# Patient Record
Sex: MALE | Race: WHITE | URBAN - METROPOLITAN AREA
[De-identification: names, ages, dates, MRNs, and addresses within clinical notes are randomized per-mention and may not be internally consistent; named-entity substitution may affect disease eponyms.]

---

## 2017-04-24 ENCOUNTER — OFFICE VISIT (OUTPATIENT)
Dept: ORTHOPEDIC SURGERY | Facility: CLINIC | Age: 47
End: 2017-04-24

## 2017-04-24 VITALS
BODY MASS INDEX: 31.14 KG/M2 | HEIGHT: 73 IN | HEART RATE: 64 BPM | DIASTOLIC BLOOD PRESSURE: 105 MMHG | WEIGHT: 235 LBS | TEMPERATURE: 97.4 F | SYSTOLIC BLOOD PRESSURE: 184 MMHG

## 2017-04-24 DIAGNOSIS — E66.9 OBESITY (BMI 30.0-34.9): ICD-10-CM

## 2017-04-24 DIAGNOSIS — T63.301A SPIDER BITE, ACCIDENTAL OR UNINTENTIONAL, INITIAL ENCOUNTER: ICD-10-CM

## 2017-04-24 DIAGNOSIS — M16.11 PRIMARY OSTEOARTHRITIS OF RIGHT HIP: Primary | ICD-10-CM

## 2017-04-24 DIAGNOSIS — M25.551 RIGHT HIP PAIN: ICD-10-CM

## 2017-04-24 RX ORDER — BUPIVACAINE HYDROCHLORIDE 2.5 MG/ML
4 INJECTION, SOLUTION EPIDURAL; INFILTRATION; INTRACAUDAL ONCE
Qty: 4 ML | Refills: 0
Start: 2017-04-24 | End: 2017-04-24

## 2017-04-24 RX ORDER — LEVOTHYROXINE SODIUM 100 UG/1
TABLET ORAL
COMMUNITY

## 2017-04-24 RX ORDER — BETAMETHASONE SODIUM PHOSPHATE AND BETAMETHASONE ACETATE 3; 3 MG/ML; MG/ML
3 INJECTION, SUSPENSION INTRA-ARTICULAR; INTRALESIONAL; INTRAMUSCULAR; SOFT TISSUE ONCE
Qty: 0.5 ML | Refills: 0
Start: 2017-04-24 | End: 2017-04-24

## 2017-04-24 NOTE — PATIENT INSTRUCTIONS
Hip Arthritis: Exercises  Your Care Instructions  Here are some examples of exercises for hip arthritis. Start each exercise slowly. Ease off the exercise if you start to have pain. Your doctor or physical therapist will tell you when you can start these exercises and which ones will work best for you. How to do the exercises  Straight-leg raises to the outside    1. Lie on your side, with your affected hip on top. 2. Tighten the front thigh muscles of your top leg to keep your knee straight. 3. Keep your hip and your leg straight in line with the rest of your body, and keep your knee pointing forward. Do not drop your hip back. 4. Lift your top leg straight up toward the ceiling, about 12 inches off the floor. Hold for about 6 seconds, then slowly lower your leg. 5. Repeat 8 to 12 times. 6. Switch legs and repeat steps 1 through 5, even if only one hip is sore. Straight-leg raises to the inside    1. Lie on your side with your affected hip on the floor. 2. You can either prop up your other leg on a chair, or you can bend that knee and put that foot in front of your other knee. Do not drop your hip back. 3. Tighten the muscles on the front thigh of your bottom leg to straighten that knee. 4. Keep your kneecap pointing forward and your leg straight, and lift your bottom leg up toward the ceiling about 6 inches. Hold for about 6 seconds, then lower slowly. 5. Repeat 8 to 12 times. 6. Switch legs and repeat steps 1 through 5, even if only one hip is sore. Hip hike    1. Stand sideways on the bottom step of a staircase, and hold on to the banister or wall. 2. Keeping both knees straight, lift your good leg off the step and let it hang down. Then hike your good hip up to the same level as your affected hip or a little higher. 3. Repeat 8 to 12 times. 4. Switch legs and repeat steps 1 through 3, even if only one hip is sore. Bridging    1. Lie on your back with both knees bent.  Your knees should be bent about 90 degrees. 2. Then push your feet into the floor, squeeze your buttocks, and lift your hips off the floor until your shoulders, hips, and knees are all in a straight line. 3. Hold for about 6 seconds as you continue to breathe normally, and then slowly lower your hips back down to the floor and rest for up to 10 seconds. 4. Repeat 8 to 12 times. Hamstring stretch (lying down)    1. Lie flat on your back with your legs straight. If you feel discomfort in your back, place a small towel roll under your lower back. 2. Holding the back of your affected leg, lift your leg straight up and toward your body until you feel a stretch at the back of your thigh. 3. Hold the stretch for at least 30 seconds. 4. Repeat 2 to 4 times. 5. Switch legs and repeat steps 1 through 4, even if only one hip is sore. Standing quadriceps stretch    1. If you are not steady on your feet, hold on to a chair, counter, or wall. You can also lie on your stomach or your side to do this exercise. 2. Bend the knee of the leg you want to stretch, and reach behind you to grab the front of your foot or ankle with the hand on the same side. For example, if you are stretching your right leg, use your right hand. 3. Keeping your knees next to each other, pull your foot toward your buttock until you feel a gentle stretch across the front of your hip and down the front of your thigh. Your knee should be pointed directly to the ground, and not out to the side. 4. Hold the stretch for at least 15 to 30 seconds. 5. Repeat 2 to 4 times. 6. Switch legs and repeat steps 1 through 5, even if only one hip is sore. Hip rotator stretch    1. Lie on your back with both knees bent and your feet flat on the floor. 2. Put the ankle of your affected leg on your opposite thigh near your knee. 3. Use your hand to gently push your knee away from your body until you feel a gentle stretch around your hip.   4. Hold the stretch for 15 to 30 seconds. 5. Repeat 2 to 4 times. 6. Repeat steps 1 through 5, but this time use your hand to gently pull your knee toward your opposite shoulder. 7. Switch legs and repeat steps 1 through 6, even if only one hip is sore. Knee-to-chest    1. Lie on your back with your knees bent and your feet flat on the floor. 2. Bring your affected leg to your chest, keeping the other foot flat on the floor (or keeping the other leg straight, whichever feels better on your lower back). 3. Keep your lower back pressed to the floor. Hold for at least 15 to 30 seconds. 4. Relax, and lower the knee to the starting position. 5. Repeat 2 to 4 times. 6. Switch legs and repeat steps 1 through 5, even if only one hip is sore. 7. To get more stretch, put your other leg flat on the floor while pulling your knee to your chest.  Clamshell    1. Lie on your side, with your affected hip on top. Keep your feet and knees together and your knees bent. 2. Raise your top knee, but keep your feet together. Do not let your hips roll back. Your legs should open up like a clamshell. 3. Hold for 6 seconds. 4. Slowly lower your knee back down. Rest for 10 seconds. 5. Repeat 8 to 12 times. 6. Switch legs and repeat steps 1 through 5, even if only one hip is sore. Follow-up care is a key part of your treatment and safety. Be sure to make and go to all appointments, and call your doctor if you are having problems. It's also a good idea to know your test results and keep a list of the medicines you take. Where can you learn more? Go to http://rajiv-cari.info/. Enter B414 in the search box to learn more about \"Hip Arthritis: Exercises. \"  Current as of: May 23, 2016  Content Version: 11.2  © 1220-8253 [employer]. Care instructions adapted under license by [employer] (which disclaims liability or warranty for this information).  If you have questions about a medical condition or this instruction, always ask your healthcare professional. [address] any warranty or liability for your use of this information. Joint Injections: Care Instructions  Your Care Instructions  Joint injections are shots into a joint, such as the knee. They may be used to put in medicines, such as pain relievers. Or they can be used to take out fluid. Sometimes the fluid is tested in a lab. This can help find the cause of a joint problem. A corticosteroid, or steroid, shot is used to reduce inflammation in tendons or joints. It is often used to treat problems such as arthritis, tendinitis, and bursitis. Steroids can be injected directly into a painful, inflamed joint. They can also help reduce inflammation of a bursa. A bursa is a sac of fluid. It cushions and lubricates areas where tendons, ligaments, skin, muscles, or bones rub against each other. A steroid shot can sometimes help with short-term pain relief when other treatments haven't worked. If steroid shots help, pain may improve for weeks or months. Follow-up care is a key part of your treatment and safety. Be sure to make and go to all appointments, and call your doctor if you are having problems. It's also a good idea to know your test results and keep a list of the medicines you take. How can you care for yourself at home? · Put ice or a cold pack on the area for 10 to 20 minutes at a time. Put a thin cloth between the ice and your skin. · Take anti-inflammatory medicines to reduce pain, swelling, or inflammation. These include ibuprofen (Advil, Motrin) and naproxen (Aleve). Read and follow all instructions on the label. · Avoid strenuous activities for several days, especially those that put stress on the area where you got the shot. · If you have dressings over the area, keep them clean and dry. You may remove them when your doctor tells you to. When should you call for help?   Call your doctor now or seek immediate medical care if:  · You have signs of infection, such as:  ¨ Increased pain, swelling, warmth, or redness. ¨ Red streaks leading from the site. ¨ Pus draining from the site. ¨ A fever. Watch closely for changes in your health, and be sure to contact your doctor if you have any problems. Where can you learn more? Go to http://rajiv-cari.info/. Enter N616 in the search box to learn more about \"Joint Injections: Care Instructions. \"  Current as of: May 23, 2016  Content Version: 11.2  © 4662-8586 [employer]. Care instructions adapted under license by [employer] (which disclaims liability or warranty for this information). If you have questions about a medical condition or this instruction, always ask your healthcare professional. [place] [address] any warranty or liability for your use of this information. Deciding About Total Hip Replacement  Your Care Instructions    Total hip replacement is surgery to replace the worn parts of your hip joint. You may be thinking about having this surgery if you have severe hip pain that limits your movement. Many people get arthritis that can cause the hip joints to wear down over time. In this surgery, your doctor uses metal, ceramic, or plastic parts to replace the ball at the upper end of the thighbone (femur) and resurface the hip socket in the pelvic bone. You can talk with your doctor to decide if this surgery is right for you. You may want to consider your age, overall health, activity level, and how much pain you have. Follow-up care is a key part of your treatment and safety. Be sure to make and go to all appointments, and call your doctor if you are having problems. It's also a good idea to know your test results and keep a list of the medicines you take. Why would you have hip replacement? · You have very bad hip pain. Medicine and other treatments do not ease the pain.   · You cannot move well enough to do your daily activities. · You want to keep golfing, biking, hiking, and doing other exercise. · You are at a healthy weight or will be able to lose weight and keep it off. Being too heavy puts strain on the hip joint. This could make a new hip wear out more quickly than it would if you were at a healthy weight. · You want to have the surgery before you lose too much of your ability to be active. If you are not able to stay active, strong, and flexible before the surgery, it can be harder to return to your normal activities after the surgery. Why would you not have hip replacement? · You are able to control your pain with medicine and other actions. These may include weight loss, changing your activities, and using a cane or crutches. You also could have shots of medicine to reduce pain in your hip. · You are still able to move well enough to work, shop, walk, and do other things. · You worry about doing physical therapy, or rehab, after the surgery. This takes 6 months. It also requires changes in how you sit, walk, and do other actions. · You have a medical problem that could mean you would not do well with anesthesia and surgery. · You may need another hip in 10 to 20 years. This is common with hip replacement. When should you call for help? Watch closely for changes in your health, and be sure to contact your doctor if:  · You want to learn more about hip replacement. · You want to have a hip replacement. Where can you learn more? Go to http://rajiv-cari.info/. Enter P339 in the search box to learn more about \"Deciding About Total Hip Replacement. \"  Current as of: May 23, 2016  Content Version: 11.2  © 5378-8920 [employer]. Care instructions adapted under license by [employer] (which disclaims liability or warranty for this information).  If you have questions about a medical condition or this instruction, always ask your healthcare professional. [employer] disclaims any warranty or liability for your use of this information.

## 2017-04-24 NOTE — PROGRESS NOTES
Patient: Henrry Quintero                MRN: [id number]       SSN: [id number]  YOB: 1970        AGE: 49 y.o. SEX: male    PCP: No primary care provider on file. 04/24/17    Chief Complaint   Patient presents with    Hip Pain     right tissue problem     HISTORY:  Henrry Quintero is a 49 y.o. male who is seen for right hip pain. He reports right hip pain since he was bitten by a funnel web spider on 4/7/16 while in [facility]. He states that he was bitten when he rolled over on the spider and had resulting internal necrosis of the soft tissue overlying his right hip though he was given the anti-venom very soon after. He was picking up a [employer] boat which he sailed to [facility] afterwards for 6 months. He notes increased hip pain with walking and experiences a popping sensation with motion. He has increased start-up pain and stiffness after sitting for long periods of time. He has increased hip pains after activity and at night. He states that he gets headaches at night from his hip pain. He takes ibuprofen regularly for pain with benefit. Pain Assessment  4/24/2017   Location of Pain Hip   Location Modifiers Right   Severity of Pain 2   Quality of Pain Dull   Duration of Pain Persistent   Aggravating Factors Walking     Occupation, etc:  Mr. 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 He states that he is technically retired but was pulled back into business since there are not many people who do what he does. He previously lived in [facility] and in [place] before that. He plans to live in [facility] [place] the next year. He is not diabetic or hypertensive. He reports that he has gained 30 pounds over the past year which he attributes to hip pain. Current weight is 235 pounds. He is 6'1\" tall. He states that he previously enjoyed rock climbing and scuba diving.       Weight Metrics 4/24/2017   Weight 235 lb   BMI 31 kg/m2     Patient Active Problem List   Diagnosis Code    Obesity (BMI 30.0-34. 9) E66.9     REVIEW OF SYSTEMS: All Below are Negative except: See HPI   Constitutional: negative for fever, chills, and weight loss. Cardiovascular: negative for chest pain, claudication, leg swelling, SOB, PEREZ   Gastrointestinal: Negative for pain, N/V/C/D, Blood in stool or urine, dysuria,  hematuria, incontinence, pelvic pain. Musculoskeletal: See HPI   Neurological: Negative for dizziness and weakness. Negative for headaches, Visual changes, confusion, seizures   Phychiatric/Behavioral: Negative for depression, memory loss, substance  abuse. Extremities: Negative for hair changes, rash, or skin lesion changes. Hematologic: Negative for bleeding problems, bruising, pallor or swollen lymph  nodes   Peripheral Vascular: No calf pain, no circulation deficits. Social History     Social History    Marital status:      Spouse name: N/A    Number of children: N/A    Years of education: N/A     Occupational History    Not on file. Social History Main Topics    Smoking status: Never Smoker    Smokeless tobacco: Not on file    Alcohol use Not on file    Drug use: Not on file    Sexual activity: Not on file     Other Topics Concern    Not on file     Social History Narrative    No narrative on file      Allergies   Allergen Reactions    Penicillins Hives      Current Outpatient Prescriptions   Medication Sig    levothyroxine (SYNTHROID) 100 mcg tablet Take  by mouth Daily (before breakfast). No current facility-administered medications for this visit.        PHYSICAL EXAMINATION:  Visit Vitals    BP (!) 184/105    Pulse 64    Temp 97.4 °F (36.3 °C)    Ht 6' 1\" (1.854 m)    Wt 235 lb (106.6 kg)    BMI 31 kg/m2      ORTHO EXAMINATION:  Examination Right hip Left hip   Skin Intact, pinpoint black area overlying the lateral right buttock Intact   External Rotation ROM 10 25   Internal Rotation ROM 5 10   Trochanteric tenderness + -   Hip flexion contracture 5 -   Antalgic gait + -   Trendelenberg sign - -   Lumbar tenderness - -   Straight leg raise - -   Calf tenderness - -   Neurovascular Intact Intact     PROCEDURE:  After discussing treatment options, patient's right hip was injected with 4 cc Marcaine and 1/2 cc Celestone. Chart reviewed for the following:   Cindy Lopes MD, have reviewed the History, Physical and updated the Allergic reactions for [address] performed immediately prior to start of procedure:  Cindy Lopes MD, have performed the following reviews on [facility] [place] prior to the start of the procedure:            * Patient was identified by name and date of birth   * Agreement on procedure being performed was verified  * Risks and Benefits explained to the patient  * Procedure site verified and marked as necessary  * Patient was positioned for comfort  * Consent was obtained     Time: 9:27 AM     Date of procedure: 4/24/2017    Procedure performed by:  Michael Roberts MD    Mr. Geovanni Contreras tolerated the procedure well with no complications. RADIOGRAPHS:  XR RIGHT HIP 4/24/17  IMPRESSION:  No fractures, moderately severe joint space narrowing, no osteophytes present. IMPRESSION:      ICD-10-CM ICD-9-CM    1. Primary osteoarthritis of right hip M16.11 715.15 betamethasone (CELESTONE SOLUSPAN) 6 mg/mL injection      BETAMETHASONE ACETATE & SODIUM PHOSPHATE INJECTION 3 MG EA.      DRAIN/INJECT LARGE JOINT/BURSA      bupivacaine, PF, (MARCAINE, PF,) 0.25 % (2.5 mg/mL) injection    Moderately severe   2. Right hip pain M25.551 719.45 AMB POC X-RAY RADEX HIP UNI WITH PELVIS 2-3 VIEWS      betamethasone (CELESTONE SOLUSPAN) 6 mg/mL injection      BETAMETHASONE ACETATE & SODIUM PHOSPHATE INJECTION 3 MG EA.      DRAIN/INJECT LARGE JOINT/BURSA      bupivacaine, PF, (MARCAINE, PF,) 0.25 % (2.5 mg/mL) injection   3.  Obesity (BMI 30.0-34. 9) E66.9 278.00    4. Spider bite, accidental or unintentional, initial encounter T63.301A 989.5      E905.1      PLAN:  After discussing treatment options, patient's right hip was injected with 4 cc Marcaine and 1/2 cc Celestone. He will follow up as needed. We discussed likely need for right hip arthroplasty at some time in the future pending weight loss below 210#. Continue weight loss efforts with a low-carb diet. He will follow up with his primary care physician for high blood pressure.       Scribed by [employer] ([address] [place] [address] [address]) as dictated by May Clifton MD

## 2017-04-24 NOTE — MR AVS SNAPSHOT
Visit Information Date & Time Provider Department Dept. Phone Encounter #  
 4/24/2017  8:45 AM Shanerita Pendleton, [address] [facility] [place] [id number] [phone] [id number] Follow-up Instructions Return if symptoms worsen or fail to improve. Upcoming Health Maintenance Date Due DTaP/Tdap/Td series (1 - Tdap) 3/19/1990 INFLUENZA AGE 9 TO ADULT 8/1/2016 Allergies as of 4/24/2017  Review Complete On: 4/24/2017 By: Lu Mcfarland Severity Noted Reaction Type Reactions Penicillins High 04/24/2017    Hives Current Immunizations  Never Reviewed No immunizations on file. Not reviewed this visit You Were Diagnosed With   
  
 Codes Comments Primary osteoarthritis of right hip    -  Primary ICD-10-CM: M16.11 
ICD-9-CM: 715.15 Moderately severe Right hip pain     ICD-10-CM: M25.551 ICD-9-CM: 719.45 Obesity (BMI 30.0-34.9)     ICD-10-CM: R41.6 ICD-9-CM: 278.00 Spider bite, accidental or unintentional, initial encounter     ICD-10-CM: T63.301A ICD-9-CM: 989.5, E905.1 Vitals BP Pulse Temp Height(growth percentile) Weight(growth percentile) BMI  
 (!) 184/105 64 97.4 °F (36.3 °C) 6' 1\" (1.854 m) 235 lb (106.6 kg) 31 kg/m2 Smoking Status Never Smoker Vitals History BMI and BSA Data Body Mass Index Body Surface Area  
 31 kg/m 2 2.34 m 2 Your Updated Medication List  
  
   
This list is accurate as of: 4/24/17  9:33 AM.  Always use your most recent med list.  
  
  
  
  
 levothyroxine 100 mcg tablet Commonly known as:  SYNTHROID Take  by mouth Daily (before breakfast). We Performed the Following AMB POC X-RAY RADEX HIP UNI WITH PELVIS 2-3 VIEWS [56115 CPT(R)] Follow-up Instructions Return if symptoms worsen or fail to improve. Patient Instructions Hip Arthritis: Exercises Your Care Instructions Here are some examples of exercises for hip arthritis. Start each exercise slowly. Ease off the exercise if you start to have pain. Your doctor or physical therapist will tell you when you can start these exercises and which ones will work best for you. How to do the exercises Straight-leg raises to the outside 1. Lie on your side, with your affected hip on top. 2. Tighten the front thigh muscles of your top leg to keep your knee straight. 3. Keep your hip and your leg straight in line with the rest of your body, and keep your knee pointing forward. Do not drop your hip back. 4. Lift your top leg straight up toward the ceiling, about 12 inches off the floor. Hold for about 6 seconds, then slowly lower your leg. 5. Repeat 8 to 12 times. 6. Switch legs and repeat steps 1 through 5, even if only one hip is sore. Straight-leg raises to the inside 1. Lie on your side with your affected hip on the floor. 2. You can either prop up your other leg on a chair, or you can bend that knee and put that foot in front of your other knee. Do not drop your hip back. 3. Tighten the muscles on the front thigh of your bottom leg to straighten that knee. 4. Keep your kneecap pointing forward and your leg straight, and lift your bottom leg up toward the ceiling about 6 inches. Hold for about 6 seconds, then lower slowly. 5. Repeat 8 to 12 times. 6. Switch legs and repeat steps 1 through 5, even if only one hip is sore. Hip hike 1. Stand sideways on the bottom step of a staircase, and hold on to the banister or wall. 2. Keeping both knees straight, lift your good leg off the step and let it hang down. Then hike your good hip up to the same level as your affected hip or a little higher. 3. Repeat 8 to 12 times. 4. Switch legs and repeat steps 1 through 3, even if only one hip is sore. Bridging 1. Lie on your back with both knees bent. Your knees should be bent about 90 degrees. 2. Then push your feet into the floor, squeeze your buttocks, and lift your hips off the floor until your shoulders, hips, and knees are all in a straight line. 3. Hold for about 6 seconds as you continue to breathe normally, and then slowly lower your hips back down to the floor and rest for up to 10 seconds. 4. Repeat 8 to 12 times. Hamstring stretch (lying down) 1. Lie flat on your back with your legs straight. If you feel discomfort in your back, place a small towel roll under your lower back. 2. Holding the back of your affected leg, lift your leg straight up and toward your body until you feel a stretch at the back of your thigh. 3. Hold the stretch for at least 30 seconds. 4. Repeat 2 to 4 times. 5. Switch legs and repeat steps 1 through 4, even if only one hip is sore. Standing quadriceps stretch 1. If you are not steady on your feet, hold on to a chair, counter, or wall. You can also lie on your stomach or your side to do this exercise. 2. Bend the knee of the leg you want to stretch, and reach behind you to grab the front of your foot or ankle with the hand on the same side. For example, if you are stretching your right leg, use your right hand. 3. Keeping your knees next to each other, pull your foot toward your buttock until you feel a gentle stretch across the front of your hip and down the front of your thigh. Your knee should be pointed directly to the ground, and not out to the side. 4. Hold the stretch for at least 15 to 30 seconds. 5. Repeat 2 to 4 times. 6. Switch legs and repeat steps 1 through 5, even if only one hip is sore. Hip rotator stretch 1. Lie on your back with both knees bent and your feet flat on the floor. 2. Put the ankle of your affected leg on your opposite thigh near your knee. 3. Use your hand to gently push your knee away from your body until you feel a gentle stretch around your hip. 4. Hold the stretch for 15 to 30 seconds. 5. Repeat 2 to 4 times. 6. Repeat steps 1 through 5, but this time use your hand to gently pull your knee toward your opposite shoulder. 7. Switch legs and repeat steps 1 through 6, even if only one hip is sore. Knee-to-chest 
 
1. Lie on your back with your knees bent and your feet flat on the floor. 2. Bring your affected leg to your chest, keeping the other foot flat on the floor (or keeping the other leg straight, whichever feels better on your lower back). 3. Keep your lower back pressed to the floor. Hold for at least 15 to 30 seconds. 4. Relax, and lower the knee to the starting position. 5. Repeat 2 to 4 times. 6. Switch legs and repeat steps 1 through 5, even if only one hip is sore. 7. To get more stretch, put your other leg flat on the floor while pulling your knee to your chest. 
Clamshell 1. Lie on your side, with your affected hip on top. Keep your feet and knees together and your knees bent. 2. Raise your top knee, but keep your feet together. Do not let your hips roll back. Your legs should open up like a clamshell. 3. Hold for 6 seconds. 4. Slowly lower your knee back down. Rest for 10 seconds. 5. Repeat 8 to 12 times. 6. Switch legs and repeat steps 1 through 5, even if only one hip is sore. Follow-up care is a key part of your treatment and safety. Be sure to make and go to all appointments, and call your doctor if you are having problems. It's also a good idea to know your test results and keep a list of the medicines you take. Where can you learn more? Go to http://rajiv-cari.info/. Enter X846 in the search box to learn more about \"Hip Arthritis: Exercises. \" Current as of: May 23, 2016 Content Version: 11.2 © 6646-2158 [employer]. Care instructions adapted under license by [employer] (which disclaims liability or warranty for this information).  If you have questions about a medical condition or this instruction, always ask your healthcare professional. [address] any warranty or liability for your use of this information. Joint Injections: Care Instructions Your Care Instructions Joint injections are shots into a joint, such as the knee. They may be used to put in medicines, such as pain relievers. Or they can be used to take out fluid. Sometimes the fluid is tested in a lab. This can help find the cause of a joint problem. A corticosteroid, or steroid, shot is used to reduce inflammation in tendons or joints. It is often used to treat problems such as arthritis, tendinitis, and bursitis. Steroids can be injected directly into a painful, inflamed joint. They can also help reduce inflammation of a bursa. A bursa is a sac of fluid. It cushions and lubricates areas where tendons, ligaments, skin, muscles, or bones rub against each other. A steroid shot can sometimes help with short-term pain relief when other treatments haven't worked. If steroid shots help, pain may improve for weeks or months. Follow-up care is a key part of your treatment and safety. Be sure to make and go to all appointments, and call your doctor if you are having problems. It's also a good idea to know your test results and keep a list of the medicines you take. How can you care for yourself at home? · Put ice or a cold pack on the area for 10 to 20 minutes at a time. Put a thin cloth between the ice and your skin. · Take anti-inflammatory medicines to reduce pain, swelling, or inflammation. These include ibuprofen (Advil, Motrin) and naproxen (Aleve). Read and follow all instructions on the label. · Avoid strenuous activities for several days, especially those that put stress on the area where you got the shot. · If you have dressings over the area, keep them clean and dry. You may remove them when your doctor tells you to. When should you call for help? Call your doctor now or seek immediate medical care if: 
· You have signs of infection, such as: 
¨ Increased pain, swelling, warmth, or redness. ¨ Red streaks leading from the site. ¨ Pus draining from the site. ¨ A fever. Watch closely for changes in your health, and be sure to contact your doctor if you have any problems. Where can you learn more? Go to http://rajiv-cari.info/. Enter N616 in the search box to learn more about \"Joint Injections: Care Instructions. \" Current as of: May 23, 2016 Content Version: 11.2 © 1942-4487 [employer]. Care instructions adapted under license by [employer] (which disclaims liability or warranty for this information). If you have questions about a medical condition or this instruction, always ask your healthcare professional. [address] any warranty or liability for your use of this information. Deciding About Total Hip Replacement Your Care Instructions Total hip replacement is surgery to replace the worn parts of your hip joint. You may be thinking about having this surgery if you have severe hip pain that limits your movement. Many people get arthritis that can cause the hip joints to wear down over time. In this surgery, your doctor uses metal, ceramic, or plastic parts to replace the ball at the upper end of the thighbone (femur) and resurface the hip socket in the pelvic bone. You can talk with your doctor to decide if this surgery is right for you. You may want to consider your age, overall health, activity level, and how much pain you have. Follow-up care is a key part of your treatment and safety. Be sure to make and go to all appointments, and call your doctor if you are having problems. It's also a good idea to know your test results and keep a list of the medicines you take. Why would you have hip replacement? · You have very bad hip pain. Medicine and other treatments do not ease the pain. · You cannot move well enough to do your daily activities. · You want to keep golfing, biking, hiking, and doing other exercise. · You are at a healthy weight or will be able to lose weight and keep it off. Being too heavy puts strain on the hip joint. This could make a new hip wear out more quickly than it would if you were at a healthy weight. · You want to have the surgery before you lose too much of your ability to be active. If you are not able to stay active, strong, and flexible before the surgery, it can be harder to return to your normal activities after the surgery. Why would you not have hip replacement? · You are able to control your pain with medicine and other actions. These may include weight loss, changing your activities, and using a cane or crutches. You also could have shots of medicine to reduce pain in your hip. · You are still able to move well enough to work, shop, walk, and do other things. · You worry about doing physical therapy, or rehab, after the surgery. This takes 6 months. It also requires changes in how you sit, walk, and do other actions. · You have a medical problem that could mean you would not do well with anesthesia and surgery. · You may need another hip in 10 to 20 years. This is common with hip replacement. When should you call for help? Watch closely for changes in your health, and be sure to contact your doctor if: 
· You want to learn more about hip replacement. · You want to have a hip replacement. Where can you learn more? Go to http://rajiv-cari.info/. Enter P339 in the search box to learn more about \"Deciding About Total Hip Replacement. \" Current as of: May 23, 2016 Content Version: 11.2 © 7947-3866 [employer].  Care instructions adapted under license by [employer] (which disclaims liability or warranty for this information). If you have questions about a medical condition or this instruction, always ask your healthcare professional. [address] any warranty or liability for your use of this information. Introducing [facility] SERVICES! Freda Gottlieb introduces [facility] patient portal. Now you can access parts of your medical record, email your doctor's office, and request medication refills online. 1. In your internet browser, go to [email or web address] 2. Click on the First Time User? Click Here link in the Sign In box. You will see the New Member Sign Up page. 3. Enter your [facility] Access Code exactly as it appears below. You will not need to use this code after youve completed the sign-up process. If you do not sign up before the expiration date, you must request a new code. · [facility] Access Code: [email or web address] Expires: 7/23/2017  9:27 AM 
 
4. Enter the last four digits of your Social Security Number (xxxx) and Date of Birth (mm/dd/yyyy) as indicated and click Submit. You will be taken to the next sign-up page. 5. Create a [facility] ID. This will be your [facility] login ID and cannot be changed, so think of one that is secure and easy to remember. 6. Create a [facility] password. You can change your password at any time. 7. Enter your Password Reset Question and Answer. This can be used at a later time if you forget your password. 8. Enter your e-mail address. You will receive e-mail notification when new information is available in [address] 9. Click Sign Up. You can now view and download portions of your medical record. 10. Click the Download Summary menu link to download a portable copy of your medical information. If you have questions, please visit the Frequently Asked Questions section of the [employer] website. Remember, [facility] is NOT to be used for urgent needs. For medical emergencies, dial 911. Now available from your iPhone and Android! Please provide this summary of care documentation to your next provider. If you have any questions after today's visit, please call [phone].